# Patient Record
Sex: MALE | URBAN - METROPOLITAN AREA
[De-identification: names, ages, dates, MRNs, and addresses within clinical notes are randomized per-mention and may not be internally consistent; named-entity substitution may affect disease eponyms.]

---

## 2023-10-05 ENCOUNTER — VIRTUAL VISIT (OUTPATIENT)
Dept: PSYCHOLOGY | Facility: CLINIC | Age: 33
End: 2023-10-05
Payer: COMMERCIAL

## 2023-10-05 DIAGNOSIS — F32.A DEPRESSION, UNSPECIFIED DEPRESSION TYPE: Primary | ICD-10-CM

## 2023-10-05 PROCEDURE — 90791 PSYCH DIAGNOSTIC EVALUATION: CPT | Mod: 95 | Performed by: COUNSELOR

## 2023-10-05 ASSESSMENT — COLUMBIA-SUICIDE SEVERITY RATING SCALE - C-SSRS
TOTAL  NUMBER OF INTERRUPTED ATTEMPTS LIFETIME: NO
6. HAVE YOU EVER DONE ANYTHING, STARTED TO DO ANYTHING, OR PREPARED TO DO ANYTHING TO END YOUR LIFE?: NO
1. HAVE YOU WISHED YOU WERE DEAD OR WISHED YOU COULD GO TO SLEEP AND NOT WAKE UP?: YES
1. IN THE PAST MONTH, HAVE YOU WISHED YOU WERE DEAD OR WISHED YOU COULD GO TO SLEEP AND NOT WAKE UP?: YES
ATTEMPT LIFETIME: NO
TOTAL  NUMBER OF ABORTED OR SELF INTERRUPTED ATTEMPTS LIFETIME: NO
2. HAVE YOU ACTUALLY HAD ANY THOUGHTS OF KILLING YOURSELF?: NO
REASONS FOR IDEATION PAST MONTH: MOSTLY TO END OR STOP THE PAIN (YOU COULDN'T GO ON LIVING WITH THE PAIN OR HOW YOU WERE FEELING)

## 2023-10-05 NOTE — PROGRESS NOTES
"    [facility]      PATIENT'S NAME: Som Her  PREFERRED NAME: Som  PRONOUNS: he/him/his     MRN: [id number]  : 1990  ADDRESS: [address] [address]  ACCT. NUMBER:  [id number]  DATE OF SERVICE: 10/05/23  START TIME: 1000  END TIME: 1100  PREFERRED PHONE: [phone]  May we leave a program related message: Yes  SERVICE MODALITY:  Video Visit:      Provider verified identity through the following two step process.  Patient provided:  Patient  and Patient address    Telemedicine Visit: The patient's condition can be safely assessed and treated via synchronous audio and visual telemedicine encounter.      Reason for Telemedicine Visit: Services only offered telehealth    Originating Site (Patient Location): Patient's home    Distant Site (Provider Location): Provider Remote Setting- Home Office    Consent:  The patient/guardian has verbally consented to: the potential risks and benefits of telemedicine (video visit) versus in person care; bill my insurance or make self-payment for services provided; and responsibility for payment of non-covered services.     Patient would like the video invitation sent by:  My Chart    Mode of Communication:  Video Conference via [facility]    Distant Location (Provider):  Off-site    As the provider I attest to compliance with applicable laws and regulations related to telemedicine.    UNIVERSAL ADULT Mental Health DIAGNOSTIC ASSESSMENT    Identifying Information:  Patient is a 33 year old,  Hmong individual.  Patient was referred for an assessment by family.  Patient attended the session alone.    Chief Complaint:   The reason for seeking services at this time is: \"Family issues\".  The problem(s) began 22.    Patient has not attempted to resolve these concerns in the past.    Social/Family History:  Patient reported they grew up in other [place], [place].  They were raised by biological mother; biological father  .  Parents  / . " " Patient reported that their childhood was \"good\".  Patient described their current relationships with family of origin as \"ok with mom, does ok with step dad\".     The patient describes their cultural background as Hmong.  Cultural influences and impact on patient's life structure, values, norms, and healthcare: It is a very role based culture, where men are expected to be the bread winner and women are to support husbands.  Hmong culture centers around family, including extended families helping each other out.  Parents expect eldest son to lead the family and take care of parents and siblings.  Daughters are to be  and taken care of by 's family..  Contextual influences on patient's health include: Contextual Factors: Individual Factors Relationship conflict .    These factors will be addressed in the Preliminary Treatment plan. Patient identified their preferred language to be English. Patient reported they does not need the assistance of an  or other support involved in therapy.     Patient reported had no significant delays in developmental tasks.   Patient's highest education level was college graduate  .  Patient identified the following learning problems: none reported.  Modifications will not be used to assist communication in therapy.  Patient reports they are  able to understand written materials.    Patient reported the following relationship history:   Patient is currently in a relationship.  Patient's current relationship status is has a partner or significant other for 6 years.   Patient identified their sexual orientation as heterosexual.  Patient reported having   0 child(mily). Patient identified partner; parents as part of their support system.  Patient identified the quality of these relationships as fair,  .      Patient's current living/housing situation involves staying in own home/apartment.  The immediate members of family and household include Yulissa Bush, 48,Mom and " step dad and they report that housing is stable.    Patient is currently employed fulltime.  Patient reports their finances are obtained through employment; parents. Patient does identify finances as a current stressor.      Patient reported that they have been involved with the legal system.  I had custody of my siblings and recently transferred to my sister and her  to care for. Patient does not report being under probation/ parole/ jurisdiction. They are not under any current court jurisdiction. .    Patient's Strengths and Limitations:  Patient identified the following strengths or resources that will help them succeed in treatment: commitment to health and well being. Things that may interfere with the patient's success in treatment include: none identified.     Assessments:  The following assessments were completed by patient for this visit:  PHQ2:       10/5/2023     9:39 AM   PHQ-2 ( 1999 [employer])   Q1: Little interest or pleasure in doing things 1   Q2: Feeling down, depressed or hopeless 1   PHQ-2 Score 2   Q1: Little interest or pleasure in doing things Several days   Q2: Feeling down, depressed or hopeless Several days   PHQ-2 Score 2     PHQ9:       9/28/2023     9:43 AM   PHQ-9 SCORE   PHQ-9 Total Score MyChart 6 (Mild depression)   PHQ-9 Total Score 6     GAD7:       9/28/2023    10:02 AM   ALIDA-7 SCORE   Total Score 7 (mild anxiety)   Total Score 7     CAGE-AID:       9/28/2023    10:03 AM   CAGE-AID Total Score   Total Score 0   Total Score MyChart 0 (A total score of 2 or greater is considered clinically significant)     PROMIS 10-Global Health (only subscores and total score):       9/28/2023    10:03 AM   PROMIS-10 Scores Only   Global Mental Health Score 9   Global Physical Health Score 12   PROMIS TOTAL - SUBSCORES 21     Story Suicide Severity Rating Scale (Lifetime/Recent)      10/5/2023    10:42 AM   Story Suicide Severity Rating (Lifetime/Recent)   Q1 Wish to be Dead (Lifetime) Y    1. Wish to be Dead (Past 1 Month) Y   Q2 Non-Specific Active Suicidal Thoughts (Lifetime) N   Most Severe Ideation Rating (Past 1 Month) 2   Frequency (Past 1 Month) 1   Duration (Past 1 Month) 2   Controllability (Past 1 Month) 1   Deterrents (Past 1 Month) 1   Reasons for Ideation (Past 1 Month) 4   Actual Attempt (Lifetime) N   Has subject engaged in non-suicidal self-injurious behavior? (Lifetime) N   Interrupted Attempts (Lifetime) N   Aborted or Self-Interrupted Attempt (Lifetime) N   Preparatory Acts or Behavior (Lifetime) N   Calculated C-SSRS Risk Score (Lifetime/Recent) Low Risk       Personal and Family Medical History:  Patient does not report a family history of mental health concerns.  Patient reports family history is not on file..     Patient does not report Mental Health Diagnosis or Treatment.      Patient has not had a physical exam to rule out medical causes for current symptoms.  Date of last physical exam was greater than a year ago and client was encouraged to schedule an exam with PCP. The patient has a non-[facility] Primary Care Provider. Their PCP is through [facility]..  Patient reports no current medical and/or dental concerns.  Patient denies any issues with pain..   There are not significant appetite / nutritional concerns / weight changes.   Patient does not report a history of head injury / trauma / cognitive impairment.      Patient reports not taking any current medications    Medication Adherence:  Patient reports not currently prescribed.      Patient Allergies:  No Known Allergies    Medical History:  No past medical history on file.      Current Mental Status Exam:   Appearance:  Appropriate    Eye Contact:  Good   Psychomotor:  Normal       Gait / station:  no problem  Attitude / Demeanor: Cooperative  Interested  Speech      Rate / Production: Normal/ Responsive      Volume:  Normal  volume      Language:  intact  Mood:   Normal  Affect:   Appropriate    Thought Content: Clear    Thought Process: Logical       Associations: No loosening of associations  Insight:   Good   Judgment:  Intact   Orientation:  All  Attention/concentration: Good    Substance Use:  Patient did not report a family history of substance use concerns; see medical history section for details.  Patient has not received chemical dependency treatment in the past.  Patient has not ever been to detox.      Patient is not currently receiving any chemical dependency treatment.           Substance History of use Age of first use Date of last use     Pattern and duration of use (include amounts and frequency)   Alcohol currently use   22 09/24/23 REPORTS SUBSTANCE USE: reports using substance 1 times per week and has 6 drinks at a time.   Patient reports heaviest use is current use.   Cannabis   never used     REPORTS SUBSTANCE USE: N/A     Amphetamines   never used     REPORTS SUBSTANCE USE: N/A   Cocaine/crack    never used       REPORTS SUBSTANCE USE: N/A   Hallucinogens never used         REPORTS SUBSTANCE USE: N/A   Inhalants never used         REPORTS SUBSTANCE USE: N/A   Heroin never used         REPORTS SUBSTANCE USE: N/A   Other Opiates never used     REPORTS SUBSTANCE USE: N/A   Benzodiazepine   never used     REPORTS SUBSTANCE USE: N/A   Barbiturates never used     REPORTS SUBSTANCE USE: N/A   Over the counter meds never used     REPORTS SUBSTANCE USE: N/A   Caffeine currently use 15   REPORTS SUBSTANCE USE: reports using substance 1 times per day and has 1 coffee at a time.   Patient reports heaviest use is current use.   Nicotine  never used     REPORTS SUBSTANCE USE: N/A   Other substances not listed above:  Identify:  never used     REPORTS SUBSTANCE USE: N/A     Patient reported the following problems as a result of their substance use: no problems, not applicable.    Substance Use: No symptoms    Based on the negative CAGE score and clinical interview there  are not indications of drug or alcohol  abuse.    Significant Losses / Trauma / Abuse / Neglect Issues:   Patient did  serve in the .  There are indications or report of significant loss, trauma, abuse or neglect issues related to: are no indications and client denies any losses, trauma, abuse, or neglect concerns.  Concerns for possible neglect are not present.     Safety Assessment:   Patient denies current homicidal ideation and behaviors.  Patient denies current self-injurious ideation and behaviors.    Patient denied risk behaviors associated with substance use.  Patient reported impulsive/compulsive spending behaviors associated with mental health symptoms.  Patient reports the following current concerns for their personal safety: None.  Patient reports there are firearms in the house.     yes, they are secured. There are no firearms in the home..    History of Safety Concerns:  Patient denied a history of homicidal ideation.     Patient denied a history of personal safety concerns.    Patient denied a history of assaultive behaviors.    Patient denied a history of sexual assault behaviors.     Patient denied a history of risk behaviors associated with substance use.  Patient denies any history of high risk behaviors associated with mental health symptoms.  Patient reports the following protective factors: forward or future oriented thinking; dedication to family or friends; living with other people; structured day; effective problem solving skills; commitment to well being    Risk Plan:  See Recommendations for Safety and Risk Management Plan    Review of Symptoms per patient report:   Depression: Change in sleep, Lack of interest, Change in energy level, Irritability, Feeling sad, down, or depressed, and Withdrawn  Irma:  No Symptoms  Psychosis: No Symptoms  Anxiety: Excessive worry, Nervousness, Sleep disturbance, and Irritability  Panic:  Sense of impending doom  Post Traumatic Stress Disorder:  No Symptoms   Eating Disorder: No  Symptoms  ADD / ADHD:  No symptoms  Conduct Disorder: No symptoms  Autism Spectrum Disorder: No symptoms  Obsessive Compulsive Disorder: No Symptoms    Patient reports the following compulsive behaviors and treatment history: Gambling - has not had treatment..      Diagnostic Criteria:    - Depressed mood. Note: In children and adolescents, can be irritable mood.     - Fatigue or loss of energy.    - Diminished ability to think or concentrate, or indecisiveness.   B) The symptoms cause clinically significant distress or impairment in social, occupational, or other important areas of functioning  C) The episode is not attributable to the physiological effects of a substance or to another medical condition  D) The occurence of major depressive episode is not better explained by other thought / psychotic disorders  E) There has never been a manic episode or hypomanic episode    Functional Status:  Patient reports the following functional impairments:  management of the household and or completion of tasks and relationship(s).     Nonprogrammatic care:  Patient is requesting basic services to address current mental health concerns.    Clinical Summary:  1. Reason for assessment: to determine level of care  .  2. Psychosocial, Cultural and Contextual Factors: Relationship conflict  .  3. Principal DSM5 Diagnoses  (Sustained by DSM5 Criteria Listed Above):   311 (F32.9) Unspecified Depressive Disorder .  4. Other Diagnoses that is relevant to services:   None at this time.  5. Provisional Diagnosis:  Further diagnosis clarification may be beneficial.  6. Prognosis: Expect Improvement.  7. Likely consequences of symptoms if not treated: higher level of care.  8. Client strengths include:  good listener, insightful, intelligent, motivated, and support of family, friends and providers .     Recommendations:     1. Plan for Safety and Risk Management:   Safety and Risk: A safety and risk management plan has been developed  including: Patient consented to co-developed safety plan.  Safety and risk management plan was completed - see below.  Patient agreed to use safety plan should any safety concerns arise.  A copy was given to the patient..          Report to child / adult protection services was NA.     2. Patient's identified  none identified .     3. Initial Treatment will focus on:    Depressed Mood -    Relational Problems related to: family .     4. Resources/Service Plan:    services are not indicated.   Modifications to assist communication are not indicated.   Additional disability accommodations are not indicated.      5. Collaboration:   Collaboration / coordination of treatment will be initiated with the following  support professionals:  None at this time .      6.  Referrals:   The following referral(s) will be initiated: [facility]. Next Scheduled Appointment: 10/11/23.      A Release of Information has been obtained for the following:  None at this time .     Clinical Substantiation/medical necessity for the above recommendations:    Patient is a 33 year old who presents with mood and family concerns.  Patient denies previous mental health concerns and/or providers.    Patients acute suicide risk was determined to be low due to the following factors: Denial of suicidal thoughts, no history of suicide attempts.   Patient denies current thoughts of suicidal ideation and self harm and reports ability to keep self safe. \Patient completed and reviewed safety plan with  and reports ability to follow plan.    Patient is not currently under the influence of alcohol or illicit substances, denies experiencing command hallucinations, and has no immediate access to firearms. Protective factors include: Patient reports the following protective factors: dedication to family/friends, safe and stable environment, daily obligations, committment to well-being, sense of personal control or  "determination and access to a variety of clinical interventions    Patient denies current substance use concerns and reports ability to maintain safety when using substances.    Patient would benefit from more extensive mental health support such as individual therapy weekly to help mitigate risk of hospitalization or suicidality. Patient is in agreement with plan.      7. RELL:    RELL:  Discussed the general effects of drugs and alcohol on health and well-being. Provider gave patient printed information about the  effects of chemical use on their health and well being. Recommendations:  to abstain from all mood altering substances .     8. Records:   These were reviewed at time of assessment.   Information in this assessment was obtained from the medical record and  provided by patient who is a good historian.    Patient will have open access to their mental health medical record.    9.   Interactive Complexity: No    10. Safety Plan:  When the patient identifies the following:  Wish to die    The following is recommended:   Complete/Review/Update Safety Plan    Safety Plan:  Adult Short Safety Plan:   Name: Som Broussard  YOB: 1990  Date: October 5, 2023   My primary care clinic: [facility]   My Triggers:  Financial concerns       Additional People, Places, and Things that I can access for support: family, friends         What is important to me and makes life worth living: \"friends and fmaily\" .         GREEN    Good Control  1. I feel good  2. No suicidal thoughts   3. Can work, sleep and play      Action Steps  1. Self-care: balanced meals, exercising, sleep practices, etc.  2. Take your medications as prescribed.  3. Continue meetings with therapist and prescriber.  4.  Do the healthy things that I enjoy.             YELLOW  Getting Worse  I have ANY of these:  1. I do not feel good  2. Difficulty Concentrating  3. Sleep is changing  4. Increase/Change in my thoughts to hurt self and/or others, but I can " still manage and not act on it.   5. Not taking care of self.               Action Steps (in addition to the above):  1. Inform your therapist and psychiatric prescriber/PCP.  2. Keep taking your medications as prescribed.    3. Turn to people you can ask for help.  4. Use internal coping strategies -see below.  5. Create safe environment: notify friends/family of increase in symptoms             RED  Get Help  If I have ANY of these:  1. Current and uncontrollable thoughts and/or behaviors to hurt self and/or others.      Actions to manage my safety  1. Contact your emergency person   2. Call or Text [phone]  3. Call my crisis team- [facility] [phone]  3. Or Call 911 or go to the emergency room right away          My Internal Coping Strategies include the following:  use my coping skills      Safety Concerns  How To Identify Situations That Make Your Mental Health Worse:  Triggers are things that make your mental health worse.  Look at the list below to help you find your triggers and what you can do about them.     1. Identify Early Warning Signs:    Sometimes symptoms return, even when people do their best to stay well. Symptoms can develop over a short period of time with little or no warning, but most of the time they emerge gradually over several weeks.  Early warning signs are changes that people experience when a relapse is starting. Some early warning signs are common and others are not as common.   Common Early Warning Signs:    Feeling depressed or low     2. Identify action steps to take when warning signs are noticed:    Taking Action- It is important to take action if you are experiencing early warning signs of a relapse.  The faster you act, the more likely it is that you can avoid a full relapse.  It is helpful to identify several specific ways to cope with symptoms.      The following is my list of symptoms and coping strategies that I can use when they are present:    Symptom Coping Strategies    Anxiety -Talk with someone in your support system and let him or her know how you are feeling.  -Use relaxation techniques such as deep breathing or imagery.  -Use positive affirmations to counteract negative self-talk such as  I am learning to let go of worry.    Depression - Schedule your day; include activities you have to do and activities you enjoy doing.  - Get some exercise - walk, run, bike, or swim.  - Give yourself credit for even the smallest things you get done.   Sleep Difficulties   - Go to sleep at the same time every day.  - Do something relaxing before bed, such as drinking herbal tea or listening to music.  - Avoid having discussions about upsetting topics before going to bed.   Delusions   - Distract yourself from the disturbing thought by doing something that requires your attention such as a puzzle.  - Check out your beliefs by talking to someone you trust.    Hallucinations   - Use headphones to listen to music.  - Tell voices to  stop  or say to yourself,  I am safe.   - Ignore the hallucinations as much as possible; focus on other things.   Concentration Difficulties - Minimize distractions so there is only one thing for you to focus on at a time.    - Ask the person you are having a conversation with to slow down or repeat things you are unsure of.             Provider Name/ Credentials:  Minerva [facility]  October 5, 2023

## 2023-10-12 ENCOUNTER — VIRTUAL VISIT (OUTPATIENT)
Dept: PSYCHOLOGY | Facility: CLINIC | Age: 33
End: 2023-10-12
Payer: COMMERCIAL

## 2023-10-12 DIAGNOSIS — F32.A DEPRESSION, UNSPECIFIED DEPRESSION TYPE: Primary | ICD-10-CM

## 2023-10-12 PROCEDURE — 90837 PSYTX W PT 60 MINUTES: CPT | Mod: VID | Performed by: COUNSELOR

## 2023-10-12 NOTE — PROGRESS NOTES
[facility]                                     Progress Note    Patient Name: Som Broussard  Date: 10/12/23         Service Type: Individual      Session Start Time: 1100  Session End Time: 1153     Session Length: 53 minutes    Session #: 1    Attendees: Client    Service Modality:  Video Visit:      Provider verified identity through the following two step process.  Patient provided:  Patient  and Patient address    Telemedicine Visit: The patient's condition can be safely assessed and treated via synchronous audio and visual telemedicine encounter.      Reason for Telemedicine Visit: Services only offered telehealth    Originating Site (Patient Location): Patient's home    Distant Site (Provider Location): [facility]    Consent:  The patient/guardian has verbally consented to: the potential risks and benefits of telemedicine (video visit) versus in person care; bill my insurance or make self-payment for services provided; and responsibility for payment of non-covered services.     Patient would like the video invitation sent by:  My Chart    Mode of Communication:  Video Conference via Amwell    Distant Location (Provider):  On-site    As the provider I attest to compliance with applicable laws and regulations related to telemedicine.    DATA  Interactive Complexity: No  Crisis: No        Progress Since Last Session (Related to Symptoms / Goals / Homework):   Symptoms: No change      Homework: Completed in session      Episode of Care Goals: Satisfactory progress - PREPARATION (Decided to change - considering how); Intervened by negotiating a change plan and determining options / strategies for behavior change, identifying triggers, exploring social supports, and working towards setting a date to begin behavior change     Current / Ongoing Stressors and Concerns:   Familial conflict     Treatment Objective(s) Addressed in This  Session:   Increase interest, engagement, and pleasure in doing things       Intervention:   Motivational Interviewing: OARS    Assessments completed prior to visit:  The following assessments were completed by patient for this visit:  PHQ9:       9/28/2023     9:43 AM   PHQ-9 SCORE   PHQ-9 Total Score MyChart 6 (Mild depression)   PHQ-9 Total Score 6     GAD7:       9/28/2023    10:02 AM   ALIDA-7 SCORE   Total Score 7 (mild anxiety)   Total Score 7         ASSESSMENT: Current Emotional / Mental Status (status of significant symptoms):   Risk status (Self / Other harm or suicidal ideation)   Patient denies current fears or concerns for personal safety.   Patient denies current or recent suicidal ideation or behaviors.   Patient denies current or recent homicidal ideation or behaviors.   Patient denies current or recent self injurious behavior or ideation.   Patient denies other safety concerns.   Patient reports there has been no change in risk factors since their last session.     Patient reports there has been no change in protective factors since their last session.     A safety and risk management plan has been developed including: Patient consented to co-developed safety plan on 10/5/23.  Safety and risk management plan was reviewed.   Patient agreed to use safety plan should any safety concerns arise.  A copy was made available to the patient.     Appearance:   Appropriate    Eye Contact:   Good    Psychomotor Behavior: Normal    Attitude:   Cooperative  Interested   Orientation:   All   Speech    Rate / Production: Normal     Volume:  Normal    Mood:    Normal   Affect:    Appropriate    Thought Content:  Clear    Thought Form:  Coherent  Logical    Insight:    Good      Medication Review:   No current psychiatric medications prescribed     Medication Compliance:   NA     Changes in Health Issues:   None reported     Chemical Use Review:   Substance Use: Chemical use reviewed, no active concerns identified       Tobacco Use: No current tobacco use.      Diagnosis:  1. Depression, unspecified depression type        Collateral Reports Completed:   Not Applicable    PLAN: (Patient Tasks / Therapist Tasks / Other)  Patient to explore activities that increase mona prior to next session.        Minerva Bobo, [facility]                                                         ______________________________________________________________________    Individual Treatment Plan    Patient's Name: Som Her  YOB: 1990    Date of Creation: 10/12/23  Date Treatment Plan Last Reviewed/Revised: NA    DSM5 Diagnoses: 311 (F32.9) Unspecified Depressive Disorder   Psychosocial / Contextual Factors: Familial conflcit  PROMIS (reviewed every 90 days):     Referral / Collaboration:  Referral to another professional/service is not indicated at this time..    Anticipated number of session for this episode of care: 9-12 sessions  Anticipation frequency of session: Weekly  Anticipated Duration of each session: 38-52 minutes  Treatment plan will be reviewed in 90 days or when goals have been changed.       MeasurableTreatment Goal(s) related to diagnosis / functional impairment(s)  Goal 1: Patient will learn and implement strategies to manage mood.    I will know I've met my goal when I feel better.      Objective #A (Patient Action)    Patient will Increase interest, engagement, and pleasure in doing things.  Status: New - Date: 10/12/23      Intervention(s)  Therapist will teach  emotional regulation skills related to improving mood .    Objective #B  Patient will Decrease frequency and intensity of feeling down, depressed, hopeless.  Status: New - Date: 10/12/23      Intervention(s)  Therapist will teach emotional recognition/identification.   .    Objective #C  Patient will Improve concentration, focus, and mindfulness in daily activities .  Status: New - Date: 10/12/23      Intervention(s)  Therapist will teach skills related to  improving concentration and time management .          Patient has reviewed and agreed to the above plan.      Minerva Bobo, [facility]  October 12, 2023

## 2023-10-19 ENCOUNTER — VIRTUAL VISIT (OUTPATIENT)
Dept: PSYCHOLOGY | Facility: CLINIC | Age: 33
End: 2023-10-19
Payer: COMMERCIAL

## 2023-10-19 DIAGNOSIS — F32.A DEPRESSION, UNSPECIFIED DEPRESSION TYPE: Primary | ICD-10-CM

## 2023-10-19 PROCEDURE — 90834 PSYTX W PT 45 MINUTES: CPT | Mod: VID | Performed by: COUNSELOR

## 2023-10-19 NOTE — PROGRESS NOTES
[facility]                                     Progress Note    Patient Name: Som Broussard  Date: 10/19/23         Service Type: Individual      Session Start Time: 1100  Session End Time: 1145     Session Length: 45 minutes    Session #: 2    Attendees: Client    Service Modality:  Video Visit:      Provider verified identity through the following two step process.  Patient provided:  Patient  and Patient address    Telemedicine Visit: The patient's condition can be safely assessed and treated via synchronous audio and visual telemedicine encounter.      Reason for Telemedicine Visit: Services only offered telehealth    Originating Site (Patient Location): Patient's home    Distant Site (Provider Location[facility]    Consent:  The patient/guardian has verbally consented to: the potential risks and benefits of telemedicine (video visit) versus in person care; bill my insurance or make self-payment for services provided; and responsibility for payment of non-covered services.     Patient would like the video invitation sent by:  My Chart    Mode of Communication:  Video Conference via Amwell    Distant Location (Provider):  On-site    As the provider I attest to compliance with applicable laws and regulations related to telemedicine.    DATA  Interactive Complexity: No  Crisis: No        Progress Since Last Session (Related to Symptoms / Goals / Homework):   Symptoms: No change      Homework: Completed in session      Episode of Care Goals: Satisfactory progress - PREPARATION (Decided to change - considering how); Intervened by negotiating a change plan and determining options / strategies for behavior change, identifying triggers, exploring social supports, and working towards setting a date to begin behavior change     Current / Ongoing Stressors and Concerns:   Familial conflict  Low mood-lack of motivation   Treatment  Objective(s) Addressed in This Session:   Increase interest, engagement, and pleasure in doing things       Intervention:   Motivational Interviewing: OARS    Assessments completed prior to visit:  The following assessments were completed by patient for this visit:  PHQ9:       9/28/2023     9:43 AM   PHQ-9 SCORE   PHQ-9 Total Score MyChart 6 (Mild depression)   PHQ-9 Total Score 6     GAD7:       9/28/2023    10:02 AM   ALIDA-7 SCORE   Total Score 7 (mild anxiety)   Total Score 7         ASSESSMENT: Current Emotional / Mental Status (status of significant symptoms):   Risk status (Self / Other harm or suicidal ideation)   Patient denies current fears or concerns for personal safety.   Patient denies current or recent suicidal ideation or behaviors.   Patient denies current or recent homicidal ideation or behaviors.   Patient denies current or recent self injurious behavior or ideation.   Patient denies other safety concerns.   Patient reports there has been no change in risk factors since their last session.     Patient reports there has been no change in protective factors since their last session.     A safety and risk management plan has been developed including: Patient consented to co-developed safety plan on 10/5/23.  Safety and risk management plan was reviewed.   Patient agreed to use safety plan should any safety concerns arise.  A copy was made available to the patient.     Appearance:   Appropriate    Eye Contact:   Good    Psychomotor Behavior: Normal    Attitude:   Cooperative  Interested   Orientation:   All   Speech    Rate / Production: Normal     Volume:  Normal    Mood:    Normal   Affect:    Appropriate    Thought Content:  Clear    Thought Form:  Coherent  Logical    Insight:    Good      Medication Review:   No current psychiatric medications prescribed     Medication Compliance:   NA     Changes in Health Issues:   None reported     Chemical Use Review:   Substance Use: Chemical use reviewed, no  active concerns identified      Tobacco Use: No current tobacco use.      Diagnosis:  1. Depression, unspecified depression type          Collateral Reports Completed:   Not Applicable    PLAN: (Patient Tasks / Therapist Tasks / Other)  Patient to incorporate breaks through out the day to self regulate, exercise and reading until next session.        Minerva Bobo, [facility]                                                         ______________________________________________________________________    Individual Treatment Plan    Patient's Name: Som Her  YOB: 1990    Date of Creation: 10/12/23  Date Treatment Plan Last Reviewed/Revised: NA    DSM5 Diagnoses: 311 (F32.9) Unspecified Depressive Disorder   Psychosocial / Contextual Factors: Familial conflcit  PROMIS (reviewed every 90 days):     Referral / Collaboration:  Referral to another professional/service is not indicated at this time..    Anticipated number of session for this episode of care: 9-12 sessions  Anticipation frequency of session: Weekly  Anticipated Duration of each session: 38-52 minutes  Treatment plan will be reviewed in 90 days or when goals have been changed.       MeasurableTreatment Goal(s) related to diagnosis / functional impairment(s)  Goal 1: Patient will learn and implement strategies to manage mood.    I will know I've met my goal when I feel better.      Objective #A (Patient Action)    Patient will Increase interest, engagement, and pleasure in doing things.  Status: New - Date: 10/12/23      Intervention(s)  Therapist will teach  emotional regulation skills related to improving mood .    Objective #B  Patient will Decrease frequency and intensity of feeling down, depressed, hopeless.  Status: New - Date: 10/12/23      Intervention(s)  Therapist will teach emotional recognition/identification.   .    Objective #C  Patient will Improve concentration, focus, and mindfulness in daily activities .  Status: New - Date:  10/12/23      Intervention(s)  Therapist will teach skills related to improving concentration and time management .          Patient has reviewed and agreed to the above plan.      Minerva Bobo, [facility]  October 12, 2023

## 2023-10-22 ENCOUNTER — HEALTH MAINTENANCE LETTER (OUTPATIENT)
Age: 33
End: 2023-10-22

## 2023-10-26 ENCOUNTER — VIRTUAL VISIT (OUTPATIENT)
Dept: PSYCHOLOGY | Facility: CLINIC | Age: 33
End: 2023-10-26
Payer: COMMERCIAL

## 2023-10-26 DIAGNOSIS — F32.A DEPRESSION, UNSPECIFIED DEPRESSION TYPE: Primary | ICD-10-CM

## 2023-10-26 PROCEDURE — 90837 PSYTX W PT 60 MINUTES: CPT | Mod: VID | Performed by: COUNSELOR

## 2023-10-26 NOTE — PROGRESS NOTES
[facility]                                     Progress Note    Patient Name: Som Broussard  Date: 10/26/23         Service Type: Individual      Session Start Time: 1100  Session End Time: 1154     Session Length: 54 minutes    Session #: 3    Attendees: Client    Service Modality:  Video Visit:      Provider verified identity through the following two step process.  Patient provided:  Patient  and Patient address    Telemedicine Visit: The patient's condition can be safely assessed and treated via synchronous audio and visual telemedicine encounter.      Reason for Telemedicine Visit: Services only offered telehealth    Originating Site (Patient Location): Patient's home    Distant Site (Provider Location): [facility]    Consent:  The patient/guardian has verbally consented to: the potential risks and benefits of telemedicine (video visit) versus in person care; bill my insurance or make self-payment for services provided; and responsibility for payment of non-covered services.     Patient would like the video invitation sent by:  My Chart    Mode of Communication:  Video Conference via Amwell    Distant Location (Provider):  On-site    As the provider I attest to compliance with applicable laws and regulations related to telemedicine.    DATA  Interactive Complexity: No  Crisis: No        Progress Since Last Session (Related to Symptoms / Goals / Homework):   Symptoms: No change      Homework: Completed in session      Episode of Care Goals: Satisfactory progress - PREPARATION (Decided to change - considering how); Intervened by negotiating a change plan and determining options / strategies for behavior change, identifying triggers, exploring social supports, and working towards setting a date to begin behavior change     Current / Ongoing Stressors and Concerns:   Familial conflict, conflict in relationship and moving out  Low  mood-lack of motivation   Treatment Objective(s) Addressed in This Session:   Increase interest, engagement, and pleasure in doing things       Intervention:   Motivational Interviewing: OARS    Assessments completed prior to visit:  The following assessments were completed by patient for this visit:  PHQ9:       9/28/2023     9:43 AM   PHQ-9 SCORE   PHQ-9 Total Score MyChart 6 (Mild depression)   PHQ-9 Total Score 6     GAD7:       9/28/2023    10:02 AM   ALIDA-7 SCORE   Total Score 7 (mild anxiety)   Total Score 7         ASSESSMENT: Current Emotional / Mental Status (status of significant symptoms):   Risk status (Self / Other harm or suicidal ideation)   Patient denies current fears or concerns for personal safety.   Patient denies current or recent suicidal ideation or behaviors.   Patient denies current or recent homicidal ideation or behaviors.   Patient denies current or recent self injurious behavior or ideation.   Patient denies other safety concerns.   Patient reports there has been no change in risk factors since their last session.     Patient reports there has been no change in protective factors since their last session.     A safety and risk management plan has been developed including: Patient consented to co-developed safety plan on 10/5/23.  Safety and risk management plan was reviewed.   Patient agreed to use safety plan should any safety concerns arise.  A copy was made available to the patient.     Appearance:   Appropriate    Eye Contact:   Good    Psychomotor Behavior: Normal    Attitude:   Cooperative  Interested   Orientation:   All   Speech    Rate / Production: Normal     Volume:  Normal    Mood:    Normal   Affect:    Appropriate    Thought Content:  Clear    Thought Form:  Coherent  Logical    Insight:    Good      Medication Review:   No current psychiatric medications prescribed     Medication Compliance:   NA     Changes in Health Issues:   None reported     Chemical Use  Review:   Substance Use: Chemical use reviewed, no active concerns identified      Tobacco Use: No current tobacco use.      Diagnosis:  1. Depression, unspecified depression type            Collateral Reports Completed:   Not Applicable    PLAN: (Patient Tasks / Therapist Tasks / Other)  Continue to monitor stress and implement breaks for self regulation        Minerva Bobo [facility]                                                         ______________________________________________________________________    Individual Treatment Plan    Patient's Name: Som Her  YOB: 1990    Date of Creation: 10/12/23  Date Treatment Plan Last Reviewed/Revised: NA    DSM5 Diagnoses: 311 (F32.9) Unspecified Depressive Disorder   Psychosocial / Contextual Factors: Familial conflcit  PROMIS (reviewed every 90 days):     Referral / Collaboration:  Referral to another professional/service is not indicated at this time..    Anticipated number of session for this episode of care: 9-12 sessions  Anticipation frequency of session: Weekly  Anticipated Duration of each session: 38-52 minutes  Treatment plan will be reviewed in 90 days or when goals have been changed.       MeasurableTreatment Goal(s) related to diagnosis / functional impairment(s)  Goal 1: Patient will learn and implement strategies to manage mood.    I will know I've met my goal when I feel better.      Objective #A (Patient Action)    Patient will Increase interest, engagement, and pleasure in doing things.  Status: New - Date: 10/12/23      Intervention(s)  Therapist will teach  emotional regulation skills related to improving mood .    Objective #B  Patient will Decrease frequency and intensity of feeling down, depressed, hopeless.  Status: New - Date: 10/12/23      Intervention(s)  Therapist will teach emotional recognition/identification.   .    Objective #C  Patient will Improve concentration, focus, and mindfulness in daily activities .  Status: New -  Date: 10/12/23      Intervention(s)  Therapist will teach skills related to improving concentration and time management .          Patient has reviewed and agreed to the above plan.      Minerva Bobo, [facility]  October 12, 2023

## 2023-11-02 ENCOUNTER — VIRTUAL VISIT (OUTPATIENT)
Dept: PSYCHOLOGY | Facility: CLINIC | Age: 33
End: 2023-11-02
Payer: COMMERCIAL

## 2023-11-02 DIAGNOSIS — F32.A DEPRESSION, UNSPECIFIED DEPRESSION TYPE: Primary | ICD-10-CM

## 2023-11-02 PROCEDURE — 90834 PSYTX W PT 45 MINUTES: CPT | Mod: VID | Performed by: COUNSELOR

## 2023-11-02 ASSESSMENT — PATIENT HEALTH QUESTIONNAIRE - PHQ9
10. IF YOU CHECKED OFF ANY PROBLEMS, HOW DIFFICULT HAVE THESE PROBLEMS MADE IT FOR YOU TO DO YOUR WORK, TAKE CARE OF THINGS AT HOME, OR GET ALONG WITH OTHER PEOPLE: SOMEWHAT DIFFICULT
SUM OF ALL RESPONSES TO PHQ QUESTIONS 1-9: 6
SUM OF ALL RESPONSES TO PHQ QUESTIONS 1-9: 6

## 2023-11-02 NOTE — PROGRESS NOTES
[facility]                                     Progress Note    Patient Name: Som Broussard  Date: 23         Service Type: Individual      Session Start Time: 1100  Session End Time: 1150     Session Length: 50 minutes    Session #: 4    Attendees: Client    Service Modality:  Video Visit:      Provider verified identity through the following two step process.  Patient provided:  Patient  and Patient address    Telemedicine Visit: The patient's condition can be safely assessed and treated via synchronous audio and visual telemedicine encounter.      Reason for Telemedicine Visit: Services only offered telehealth    Originating Site (Patient Location): Patient's home    Distant Site (Provider Location): [facility]    Consent:  The patient/guardian has verbally consented to: the potential risks and benefits of telemedicine (video visit) versus in person care; bill my insurance or make self-payment for services provided; and responsibility for payment of non-covered services.     Patient would like the video invitation sent by:  My Chart    Mode of Communication:  Video Conference via Amwell    Distant Location (Provider):  On-site    As the provider I attest to compliance with applicable laws and regulations related to telemedicine.    DATA  Interactive Complexity: No  Crisis: No        Progress Since Last Session (Related to Symptoms / Goals / Homework):   Symptoms:Improving    Homework: Completed in session      Episode of Care Goals: Satisfactory progress - PREPARATION (Decided to change - considering how); Intervened by negotiating a change plan and determining options / strategies for behavior change, identifying triggers, exploring social supports, and working towards setting a date to begin behavior change     Current / Ongoing Stressors and Concerns:   Familial conflict, conflict in relationship and moving out  Low mood-lack  of motivation   Treatment Objective(s) Addressed in This Session:   Increase interest, engagement, and pleasure in doing things       Intervention:   Motivational Interviewing: OARS    Assessments completed prior to visit:  The following assessments were completed by patient for this visit:  PHQ9:       9/28/2023     9:43 AM 11/2/2023    10:53 AM   PHQ-9 SCORE   PHQ-9 Total Score MyChart 6 (Mild depression) 6 (Mild depression)   PHQ-9 Total Score 6 6     GAD7:       9/28/2023    10:02 AM   ALIDA-7 SCORE   Total Score 7 (mild anxiety)   Total Score 7         ASSESSMENT: Current Emotional / Mental Status (status of significant symptoms):   Risk status (Self / Other harm or suicidal ideation)   Patient denies current fears or concerns for personal safety.   Patient denies current or recent suicidal ideation or behaviors.   Patient denies current or recent homicidal ideation or behaviors.   Patient denies current or recent self injurious behavior or ideation.   Patient denies other safety concerns.   Patient reports there has been no change in risk factors since their last session.     Patient reports there has been no change in protective factors since their last session.     A safety and risk management plan has been developed including: Patient consented to co-developed safety plan on 10/5/23.  Safety and risk management plan was reviewed.   Patient agreed to use safety plan should any safety concerns arise.  A copy was made available to the patient.     Appearance:   Appropriate    Eye Contact:   Good    Psychomotor Behavior: Normal    Attitude:   Cooperative  Interested   Orientation:   All   Speech    Rate / Production: Normal     Volume:  Normal    Mood:    Normal   Affect:    Appropriate    Thought Content:  Clear    Thought Form:  Coherent  Logical    Insight:    Good      Medication Review:   No current psychiatric medications prescribed     Medication Compliance:   NA     Changes in Health Issues:   None  reported     Chemical Use Review:   Substance Use: Chemical use reviewed, no active concerns identified      Tobacco Use: No current tobacco use.      Diagnosis:  1. Depression, unspecified depression type              Collateral Reports Completed:   Not Applicable    PLAN: (Patient Tasks / Therapist Tasks / Other)  Continue to monitor stress and implement breaks for self regulation, explore relationship values and how to discuss them with parents.        Minerva Bobo, [facility]                                                         ______________________________________________________________________    Individual Treatment Plan    Patient's Name: Som Her  YOB: 1990    Date of Creation: 10/12/23  Date Treatment Plan Last Reviewed/Revised: NA    DSM5 Diagnoses: 311 (F32.9) Unspecified Depressive Disorder   Psychosocial / Contextual Factors: Familial conflcit  PROMIS (reviewed every 90 days):     Referral / Collaboration:  Referral to another professional/service is not indicated at this time..    Anticipated number of session for this episode of care: 9-12 sessions  Anticipation frequency of session: Weekly  Anticipated Duration of each session: 38-52 minutes  Treatment plan will be reviewed in 90 days or when goals have been changed.       MeasurableTreatment Goal(s) related to diagnosis / functional impairment(s)  Goal 1: Patient will learn and implement strategies to manage mood.    I will know I've met my goal when I feel better.      Objective #A (Patient Action)    Patient will Increase interest, engagement, and pleasure in doing things.  Status: New - Date: 10/12/23      Intervention(s)  Therapist will teach  emotional regulation skills related to improving mood .    Objective #B  Patient will Decrease frequency and intensity of feeling down, depressed, hopeless.  Status: New - Date: 10/12/23      Intervention(s)  Therapist will teach emotional recognition/identification.   .    Objective  #C  Patient will Improve concentration, focus, and mindfulness in daily activities .  Status: New - Date: 10/12/23      Intervention(s)  Therapist will teach skills related to improving concentration and time management .          Patient has reviewed and agreed to the above plan.      Minerva Bobo, [facility]  October 12, 2023

## 2023-11-09 ENCOUNTER — VIRTUAL VISIT (OUTPATIENT)
Dept: PSYCHOLOGY | Facility: CLINIC | Age: 33
End: 2023-11-09
Payer: COMMERCIAL

## 2023-11-09 DIAGNOSIS — F32.A DEPRESSION, UNSPECIFIED DEPRESSION TYPE: Primary | ICD-10-CM

## 2023-11-09 PROCEDURE — 90837 PSYTX W PT 60 MINUTES: CPT | Mod: VID | Performed by: COUNSELOR

## 2023-11-09 ASSESSMENT — PATIENT HEALTH QUESTIONNAIRE - PHQ9
10. IF YOU CHECKED OFF ANY PROBLEMS, HOW DIFFICULT HAVE THESE PROBLEMS MADE IT FOR YOU TO DO YOUR WORK, TAKE CARE OF THINGS AT HOME, OR GET ALONG WITH OTHER PEOPLE: SOMEWHAT DIFFICULT
SUM OF ALL RESPONSES TO PHQ QUESTIONS 1-9: 7
SUM OF ALL RESPONSES TO PHQ QUESTIONS 1-9: 7

## 2023-11-09 NOTE — PROGRESS NOTES
[facility]                                     Progress Note    Patient Name: Som Broussard  Date: 23         Service Type: Individual      Session Start Time: 1100  Session End Time: 1155     Session Length: 55 minutes    Session #: 5    Attendees: Client    Service Modality:  Video Visit:      Provider verified identity through the following two step process.  Patient provided:  Patient  and Patient address    Telemedicine Visit: The patient's condition can be safely assessed and treated via synchronous audio and visual telemedicine encounter.      Reason for Telemedicine Visit: Services only offered telehealth    Originating Site (Patient Location): Patient's home    Distant Site (Provider Location[facility]    Consent:  The patient/guardian has verbally consented to: the potential risks and benefits of telemedicine (video visit) versus in person care; bill my insurance or make self-payment for services provided; and responsibility for payment of non-covered services.     Patient would like the video invitation sent by:  My Chart    Mode of Communication:  Video Conference via Amwell    Distant Location (Provider):  On-site    As the provider I attest to compliance with applicable laws and regulations related to telemedicine.    DATA  Interactive Complexity: No  Crisis: No        Progress Since Last Session (Related to Symptoms / Goals / Homework):   Symptoms:Improving    Homework: Completed in session      Episode of Care Goals: Satisfactory progress - PREPARATION (Decided to change - considering how); Intervened by negotiating a change plan and determining options / strategies for behavior change, identifying triggers, exploring social supports, and working towards setting a date to begin behavior change     Current / Ongoing Stressors and Concerns:   Familial conflict, Death of Uncle  Low mood-lack of motivation   Treatment  Objective(s) Addressed in This Session:   Increase interest, engagement, and pleasure in doing things       Intervention:   Motivational Interviewing: OARS    Assessments completed prior to visit:  The following assessments were completed by patient for this visit:  PHQ9:       9/28/2023     9:43 AM 11/2/2023    10:53 AM   PHQ-9 SCORE   PHQ-9 Total Score MyChart 6 (Mild depression) 6 (Mild depression)   PHQ-9 Total Score 6 6     GAD7:       9/28/2023    10:02 AM   ALIDA-7 SCORE   Total Score 7 (mild anxiety)   Total Score 7         ASSESSMENT: Current Emotional / Mental Status (status of significant symptoms):   Risk status (Self / Other harm or suicidal ideation)   Patient denies current fears or concerns for personal safety.   Patient denies current or recent suicidal ideation or behaviors.   Patient denies current or recent homicidal ideation or behaviors.   Patient denies current or recent self injurious behavior or ideation.   Patient denies other safety concerns.   Patient reports there has been no change in risk factors since their last session.     Patient reports there has been no change in protective factors since their last session.     A safety and risk management plan has been developed including: Patient consented to co-developed safety plan on 10/5/23.  Safety and risk management plan was reviewed.   Patient agreed to use safety plan should any safety concerns arise.  A copy was made available to the patient.     Appearance:   Appropriate    Eye Contact:   Good    Psychomotor Behavior: Normal    Attitude:   Cooperative  Interested   Orientation:   All   Speech    Rate / Production: Normal     Volume:  Normal    Mood:    Normal   Affect:    Appropriate    Thought Content:  Clear    Thought Form:  Coherent  Logical    Insight:    Good      Medication Review:   No current psychiatric medications prescribed     Medication Compliance:   NA     Changes in Health Issues:   None reported     Chemical Use  Review:   Substance Use: Chemical use reviewed, no active concerns identified      Tobacco Use: No current tobacco use.      Diagnosis:  1. Depression, unspecified depression type                Collateral Reports Completed:   Not Applicable    PLAN: (Patient Tasks / Therapist Tasks / Other)  Patient to continue with self regulation techniques and explore future goals.        Minerva Bobo, [facility]                                                         ______________________________________________________________________    Individual Treatment Plan    Patient's Name: Som Her  YOB: 1990    Date of Creation: 10/12/23  Date Treatment Plan Last Reviewed/Revised: NA    DSM5 Diagnoses: 311 (F32.9) Unspecified Depressive Disorder   Psychosocial / Contextual Factors: Familial conflcit  PROMIS (reviewed every 90 days):     Referral / Collaboration:  Referral to another professional/service is not indicated at this time..    Anticipated number of session for this episode of care: 9-12 sessions  Anticipation frequency of session: Weekly  Anticipated Duration of each session: 38-52 minutes  Treatment plan will be reviewed in 90 days or when goals have been changed.       MeasurableTreatment Goal(s) related to diagnosis / functional impairment(s)  Goal 1: Patient will learn and implement strategies to manage mood.    I will know I've met my goal when I feel better.      Objective #A (Patient Action)    Patient will Increase interest, engagement, and pleasure in doing things.  Status: New - Date: 10/12/23      Intervention(s)  Therapist will teach  emotional regulation skills related to improving mood .    Objective #B  Patient will Decrease frequency and intensity of feeling down, depressed, hopeless.  Status: New - Date: 10/12/23      Intervention(s)  Therapist will teach emotional recognition/identification.   .    Objective #C  Patient will Improve concentration, focus, and mindfulness in daily activities  .  Status: New - Date: 10/12/23      Intervention(s)  Therapist will teach skills related to improving concentration and time management .          Patient has reviewed and agreed to the above plan.      Minerva Bobo, [facility]  October 12, 2023

## 2023-11-16 ENCOUNTER — VIRTUAL VISIT (OUTPATIENT)
Dept: PSYCHOLOGY | Facility: CLINIC | Age: 33
End: 2023-11-16
Payer: COMMERCIAL

## 2023-11-16 DIAGNOSIS — F32.A DEPRESSION, UNSPECIFIED DEPRESSION TYPE: Primary | ICD-10-CM

## 2023-11-16 PROCEDURE — 90837 PSYTX W PT 60 MINUTES: CPT | Mod: VID | Performed by: COUNSELOR

## 2023-11-16 NOTE — PROGRESS NOTES
[facility]                                     Progress Note    Patient Name: Som Broussard  Date: 23         Service Type: Individual      Session Start Time: 1100  Session End Time: 1155     Session Length: 55 minutes    Session #: 6    Attendees: Client    Service Modality:  Video Visit:      Provider verified identity through the following two step process.  Patient provided:  Patient  and Patient address    Telemedicine Visit: The patient's condition can be safely assessed and treated via synchronous audio and visual telemedicine encounter.      Reason for Telemedicine Visit: Services only offered telehealth    Originating Site (Patient Location): Patient's home    Distant Site (Provider [facility]    Consent:  The patient/guardian has verbally consented to: the potential risks and benefits of telemedicine (video visit) versus in person care; bill my insurance or make self-payment for services provided; and responsibility for payment of non-covered services.     Patient would like the video invitation sent by:  My Chart    Mode of Communication:  Video Conference via Amwell    Distant Location (Provider):  On-site    As the provider I attest to compliance with applicable laws and regulations related to telemedicine.    DATA  Interactive Complexity: No  Crisis: No        Progress Since Last Session (Related to Symptoms / Goals / Homework):   Symptoms:Improving    Homework: Completed in session      Episode of Care Goals: Satisfactory progress - PREPARATION (Decided to change - considering how); Intervened by negotiating a change plan and determining options / strategies for behavior change, identifying triggers, exploring social supports, and working towards setting a date to begin behavior change     Current / Ongoing Stressors and Concerns:   Familial contact and moving out    Low mood-lack of  motivation   Treatment Objective(s) Addressed in This Session:   Increase interest, engagement, and pleasure in doing things       Intervention:   Motivational Interviewing: OARS    Assessments completed prior to visit:  The following assessments were completed by patient for this visit:  PHQ9:       9/28/2023     9:43 AM 11/2/2023    10:53 AM 11/9/2023    10:50 AM   PHQ-9 SCORE   PHQ-9 Total Score MyChart 6 (Mild depression) 6 (Mild depression) 7 (Mild depression)   PHQ-9 Total Score 6 6 7     GAD7:       9/28/2023    10:02 AM   ALIDA-7 SCORE   Total Score 7 (mild anxiety)   Total Score 7         ASSESSMENT: Current Emotional / Mental Status (status of significant symptoms):   Risk status (Self / Other harm or suicidal ideation)   Patient denies current fears or concerns for personal safety.   Patient denies current or recent suicidal ideation or behaviors.   Patient denies current or recent homicidal ideation or behaviors.   Patient denies current or recent self injurious behavior or ideation.   Patient denies other safety concerns.   Patient reports there has been no change in risk factors since their last session.     Patient reports there has been no change in protective factors since their last session.     A safety and risk management plan has been developed including: Patient consented to co-developed safety plan on 10/5/23.  Safety and risk management plan was reviewed.   Patient agreed to use safety plan should any safety concerns arise.  A copy was made available to the patient.     Appearance:   Appropriate    Eye Contact:   Good    Psychomotor Behavior: Normal    Attitude:   Cooperative  Interested   Orientation:   All   Speech    Rate / Production: Normal     Volume:  Normal    Mood:    Normal   Affect:    Appropriate    Thought Content:  Clear    Thought Form:  Coherent  Logical    Insight:    Good      Medication Review:   No current psychiatric medications prescribed     Medication  Compliance:   NA     Changes in Health Issues:   None reported     Chemical Use Review:   Substance Use: Chemical use reviewed, no active concerns identified      Tobacco Use: No current tobacco use.      Diagnosis:  1. Depression, unspecified depression type                  Collateral Reports Completed:   Not Applicable    PLAN: (Patient Tasks / Therapist Tasks / Other)  Patient to have conversations with family regarding next steps and moving out.        Minerva Bobo, [facility]                                                         ______________________________________________________________________    Individual Treatment Plan    Patient's Name: Som Her  YOB: 1990    Date of Creation: 10/12/23  Date Treatment Plan Last Reviewed/Revised: NA    DSM5 Diagnoses: 311 (F32.9) Unspecified Depressive Disorder   Psychosocial / Contextual Factors: Familial conflcit  PROMIS (reviewed every 90 days):     Referral / Collaboration:  Referral to another professional/service is not indicated at this time..    Anticipated number of session for this episode of care: 9-12 sessions  Anticipation frequency of session: Weekly  Anticipated Duration of each session: 38-52 minutes  Treatment plan will be reviewed in 90 days or when goals have been changed.       MeasurableTreatment Goal(s) related to diagnosis / functional impairment(s)  Goal 1: Patient will learn and implement strategies to manage mood.    I will know I've met my goal when I feel better.      Objective #A (Patient Action)    Patient will Increase interest, engagement, and pleasure in doing things.  Status: New - Date: 10/12/23      Intervention(s)  Therapist will teach  emotional regulation skills related to improving mood .    Objective #B  Patient will Decrease frequency and intensity of feeling down, depressed, hopeless.  Status: New - Date: 10/12/23      Intervention(s)  Therapist will teach emotional recognition/identification.   .    Objective  #C  Patient will Improve concentration, focus, and mindfulness in daily activities .  Status: New - Date: 10/12/23      Intervention(s)  Therapist will teach skills related to improving concentration and time management .          Patient has reviewed and agreed to the above plan.      Minerva Bobo, [facility]  October 12, 2023

## 2023-11-30 ENCOUNTER — VIRTUAL VISIT (OUTPATIENT)
Dept: PSYCHOLOGY | Facility: CLINIC | Age: 33
End: 2023-11-30
Payer: COMMERCIAL

## 2023-11-30 DIAGNOSIS — F32.A DEPRESSION, UNSPECIFIED DEPRESSION TYPE: Primary | ICD-10-CM

## 2023-11-30 PROCEDURE — 90834 PSYTX W PT 45 MINUTES: CPT | Mod: VID | Performed by: COUNSELOR

## 2023-11-30 ASSESSMENT — PATIENT HEALTH QUESTIONNAIRE - PHQ9
SUM OF ALL RESPONSES TO PHQ QUESTIONS 1-9: 7
10. IF YOU CHECKED OFF ANY PROBLEMS, HOW DIFFICULT HAVE THESE PROBLEMS MADE IT FOR YOU TO DO YOUR WORK, TAKE CARE OF THINGS AT HOME, OR GET ALONG WITH OTHER PEOPLE: SOMEWHAT DIFFICULT
SUM OF ALL RESPONSES TO PHQ QUESTIONS 1-9: 7

## 2023-11-30 NOTE — PROGRESS NOTES
[facility]                                     Progress Note    Patient Name: Som Broussard  Date: 23         Service Type: Individual      Session Start Time: 1100  Session End Time: 1152     Session Length: 52 minutes    Session #: 7    Attendees: Client    Service Modality:  Video Visit:      Provider verified identity through the following two step process.  Patient provided:  Patient  and Patient address    Telemedicine Visit: The patient's condition can be safely assessed and treated via synchronous audio and visual telemedicine encounter.      Reason for Telemedicine Visit: Services only offered telehealth    Originating Site (Patient Location): Patient's home    Distant Site (Provider [facility]    Consent:  The patient/guardian has verbally consented to: the potential risks and benefits of telemedicine (video visit) versus in person care; bill my insurance or make self-payment for services provided; and responsibility for payment of non-covered services.     Patient would like the video invitation sent by:  My Chart    Mode of Communication:  Video Conference via Amwell    Distant Location (Provider):  On-site    As the provider I attest to compliance with applicable laws and regulations related to telemedicine.    DATA  Interactive Complexity: No  Crisis: No        Progress Since Last Session (Related to Symptoms / Goals / Homework):   Symptoms:Improving    Homework: Completed in session      Episode of Care Goals: Satisfactory progress - PREPARATION (Decided to change - considering how); Intervened by negotiating a change plan and determining options / strategies for behavior change, identifying triggers, exploring social supports, and working towards setting a date to begin behavior change     Current / Ongoing Stressors and Concerns:   Familial contact and moving out    Low mood-lack of  motivation   Treatment Objective(s) Addressed in This Session:   Increase interest, engagement, and pleasure in doing things       Intervention:   Motivational Interviewing: OARS    Assessments completed prior to visit:  The following assessments were completed by patient for this visit:  PHQ9:       9/28/2023     9:43 AM 11/2/2023    10:53 AM 11/9/2023    10:50 AM 11/30/2023    10:56 AM   PHQ-9 SCORE   PHQ-9 Total Score MyChart 6 (Mild depression) 6 (Mild depression) 7 (Mild depression) 7 (Mild depression)   PHQ-9 Total Score 6 6 7 7     GAD7:       9/28/2023    10:02 AM   ALIDA-7 SCORE   Total Score 7 (mild anxiety)   Total Score 7         ASSESSMENT: Current Emotional / Mental Status (status of significant symptoms):   Risk status (Self / Other harm or suicidal ideation)   Patient denies current fears or concerns for personal safety.   Patient denies current or recent suicidal ideation or behaviors.   Patient denies current or recent homicidal ideation or behaviors.   Patient denies current or recent self injurious behavior or ideation.   Patient denies other safety concerns.   Patient reports there has been no change in risk factors since their last session.     Patient reports there has been no change in protective factors since their last session.     A safety and risk management plan has been developed including: Patient consented to co-developed safety plan on 10/5/23.  Safety and risk management plan was reviewed.   Patient agreed to use safety plan should any safety concerns arise.  A copy was made available to the patient.     Appearance:   Appropriate    Eye Contact:   Good    Psychomotor Behavior: Normal    Attitude:   Cooperative  Interested   Orientation:   All   Speech    Rate / Production: Normal     Volume:  Normal    Mood:    Normal   Affect:    Appropriate    Thought Content:  Clear    Thought Form:  Coherent  Logical    Insight:    Good      Medication Review:   No current psychiatric medications  prescribed     Medication Compliance:   NA     Changes in Health Issues:   None reported     Chemical Use Review:   Substance Use: Chemical use reviewed, no active concerns identified      Tobacco Use: No current tobacco use.      Diagnosis:  1. Depression, unspecified depression type                    Collateral Reports Completed:   Not Applicable    PLAN: (Patient Tasks / Therapist Tasks / Other)  Patient to work on building trust with family and focusing on longer term benefits of financial stability.        Minerva Bobo, [facility]                                                         ______________________________________________________________________    Individual Treatment Plan    Patient's Name: Som Her  YOB: 1990    Date of Creation: 10/12/23  Date Treatment Plan Last Reviewed/Revised: NA    DSM5 Diagnoses: 311 (F32.9) Unspecified Depressive Disorder   Psychosocial / Contextual Factors: Familial conflcit  PROMIS (reviewed every 90 days):     Referral / Collaboration:  Referral to another professional/service is not indicated at this time..    Anticipated number of session for this episode of care: 9-12 sessions  Anticipation frequency of session: Weekly  Anticipated Duration of each session: 38-52 minutes  Treatment plan will be reviewed in 90 days or when goals have been changed.       MeasurableTreatment Goal(s) related to diagnosis / functional impairment(s)  Goal 1: Patient will learn and implement strategies to manage mood.    I will know I've met my goal when I feel better.      Objective #A (Patient Action)    Patient will Increase interest, engagement, and pleasure in doing things.  Status: New - Date: 10/12/23      Intervention(s)  Therapist will teach  emotional regulation skills related to improving mood .    Objective #B  Patient will Decrease frequency and intensity of feeling down, depressed, hopeless.  Status: New - Date: 10/12/23      Intervention(s)  Therapist will teach  emotional recognition/identification.   .    Objective #C  Patient will Improve concentration, focus, and mindfulness in daily activities .  Status: New - Date: 10/12/23      Intervention(s)  Therapist will teach skills related to improving concentration and time management .          Patient has reviewed and agreed to the above plan.      Minerva Bobo, [facility]  October 12, 2023

## 2023-12-07 ENCOUNTER — VIRTUAL VISIT (OUTPATIENT)
Dept: PSYCHOLOGY | Facility: CLINIC | Age: 33
End: 2023-12-07
Payer: COMMERCIAL

## 2023-12-07 DIAGNOSIS — F32.A DEPRESSION, UNSPECIFIED DEPRESSION TYPE: Primary | ICD-10-CM

## 2023-12-07 PROCEDURE — 90834 PSYTX W PT 45 MINUTES: CPT | Mod: VID | Performed by: COUNSELOR

## 2023-12-07 ASSESSMENT — PATIENT HEALTH QUESTIONNAIRE - PHQ9
SUM OF ALL RESPONSES TO PHQ QUESTIONS 1-9: 7
SUM OF ALL RESPONSES TO PHQ QUESTIONS 1-9: 7
10. IF YOU CHECKED OFF ANY PROBLEMS, HOW DIFFICULT HAVE THESE PROBLEMS MADE IT FOR YOU TO DO YOUR WORK, TAKE CARE OF THINGS AT HOME, OR GET ALONG WITH OTHER PEOPLE: NOT DIFFICULT AT ALL

## 2023-12-07 NOTE — PROGRESS NOTES
[facility]                                     Progress Note    Patient Name: Som Broussard  Date: 23         Service Type: Individual      Session Start Time: 1100  Session End Time: 1145     Session Length: 45 minutes    Session #: 8    Attendees: Client    Service Modality:  Video Visit:      Provider verified identity through the following two step process.  Patient provided:  Patient  and Patient address    Telemedicine Visit: The patient's condition can be safely assessed and treated via synchronous audio and visual telemedicine encounter.      Reason for Telemedicine Visit: Services only offered telehealth    Originating Site (Patient Location): Patient's home    Distant Site (Provider Location): [facility] [place] [facility]    Consent:  The patient/guardian has verbally consented to: the potential risks and benefits of telemedicine (video visit) versus in person care; bill my insurance or make self-payment for services provided; and responsibility for payment of non-covered services.     Patient would like the video invitation sent by:  My Chart    Mode of Communication:  Video Conference via Amwell    Distant Location (Provider):  On-site    As the provider I attest to compliance with applicable laws and regulations related to telemedicine.    DATA  Interactive Complexity: No  Crisis: No        Progress Since Last Session (Related to Symptoms / Goals / Homework):   Symptoms:Improving    Homework: Completed in session      Episode of Care Goals: Satisfactory progress - PREPARATION (Decided to change - considering how); Intervened by negotiating a change plan and determining options / strategies for behavior change, identifying triggers, exploring social supports, and working towards setting a date to begin behavior change     Current / Ongoing Stressors and Concerns:   Familial contact and moving out    Low mood-lack of motivation   Treatment  Objective(s) Addressed in This Session:   Increase interest, engagement, and pleasure in doing things       Intervention:   Motivational Interviewing: OARS    Assessments completed prior to visit:  The following assessments were completed by patient for this visit:  PHQ9:       9/28/2023     9:43 AM 11/2/2023    10:53 AM 11/9/2023    10:50 AM 11/30/2023    10:56 AM 12/7/2023    10:58 AM   PHQ-9 SCORE   PHQ-9 Total Score MyChart 6 (Mild depression) 6 (Mild depression) 7 (Mild depression) 7 (Mild depression) 7 (Mild depression)   PHQ-9 Total Score 6 6 7 7 7     GAD7:       9/28/2023    10:02 AM   ALIDA-7 SCORE   Total Score 7 (mild anxiety)   Total Score 7         ASSESSMENT: Current Emotional / Mental Status (status of significant symptoms):   Risk status (Self / Other harm or suicidal ideation)   Patient denies current fears or concerns for personal safety.   Patient denies current or recent suicidal ideation or behaviors.   Patient denies current or recent homicidal ideation or behaviors.   Patient denies current or recent self injurious behavior or ideation.   Patient denies other safety concerns.   Patient reports there has been no change in risk factors since their last session.     Patient reports there has been no change in protective factors since their last session.     A safety and risk management plan has been developed including: Patient consented to co-developed safety plan on 10/5/23.  Safety and risk management plan was reviewed.   Patient agreed to use safety plan should any safety concerns arise.  A copy was made available to the patient.     Appearance:   Appropriate    Eye Contact:   Good    Psychomotor Behavior: Normal    Attitude:   Cooperative  Interested   Orientation:   All   Speech    Rate / Production: Normal     Volume:  Normal    Mood:    Normal   Affect:    Appropriate    Thought Content:  Clear    Thought Form:  Coherent  Logical    Insight:    Good      Medication Review:   No current  psychiatric medications prescribed     Medication Compliance:   NA     Changes in Health Issues:   None reported     Chemical Use Review:   Substance Use: Chemical use reviewed, no active concerns identified      Tobacco Use: No current tobacco use.      Diagnosis:  1. Depression, unspecified depression type                      Collateral Reports Completed:   Not Applicable    PLAN: (Patient Tasks / Therapist Tasks / Other)  Patient to continue to practice self regulation and identify expectations of self.      Minerva Bobo, [facility]                                                         ______________________________________________________________________    Individual Treatment Plan    Patient's Name: Som Her  YOB: 1990    Date of Creation: 10/12/23  Date Treatment Plan Last Reviewed/Revised: NA    DSM5 Diagnoses: 311 (F32.9) Unspecified Depressive Disorder   Psychosocial / Contextual Factors: Familial conflcit  PROMIS (reviewed every 90 days):     Referral / Collaboration:  Referral to another professional/service is not indicated at this time..    Anticipated number of session for this episode of care: 9-12 sessions  Anticipation frequency of session: Weekly  Anticipated Duration of each session: 38-52 minutes  Treatment plan will be reviewed in 90 days or when goals have been changed.       MeasurableTreatment Goal(s) related to diagnosis / functional impairment(s)  Goal 1: Patient will learn and implement strategies to manage mood.    I will know I've met my goal when I feel better.      Objective #A (Patient Action)    Patient will Increase interest, engagement, and pleasure in doing things.  Status: New - Date: 10/12/23      Intervention(s)  Therapist will teach  emotional regulation skills related to improving mood .    Objective #B  Patient will Decrease frequency and intensity of feeling down, depressed, hopeless.  Status: New - Date: 10/12/23      Intervention(s)  Therapist will teach  emotional recognition/identification.   .    Objective #C  Patient will Improve concentration, focus, and mindfulness in daily activities .  Status: New - Date: 10/12/23      Intervention(s)  Therapist will teach skills related to improving concentration and time management .          Patient has reviewed and agreed to the above plan.      Minerva Bobo, [facility]  October 12, 2023

## 2023-12-21 ENCOUNTER — VIRTUAL VISIT (OUTPATIENT)
Dept: PSYCHOLOGY | Facility: CLINIC | Age: 33
End: 2023-12-21
Payer: COMMERCIAL

## 2023-12-21 DIAGNOSIS — F32.A DEPRESSION, UNSPECIFIED DEPRESSION TYPE: Primary | ICD-10-CM

## 2023-12-21 PROCEDURE — 90834 PSYTX W PT 45 MINUTES: CPT | Mod: VID | Performed by: COUNSELOR

## 2023-12-21 ASSESSMENT — PATIENT HEALTH QUESTIONNAIRE - PHQ9
SUM OF ALL RESPONSES TO PHQ QUESTIONS 1-9: 7
10. IF YOU CHECKED OFF ANY PROBLEMS, HOW DIFFICULT HAVE THESE PROBLEMS MADE IT FOR YOU TO DO YOUR WORK, TAKE CARE OF THINGS AT HOME, OR GET ALONG WITH OTHER PEOPLE: NOT DIFFICULT AT ALL
SUM OF ALL RESPONSES TO PHQ QUESTIONS 1-9: 7

## 2023-12-21 NOTE — PROGRESS NOTES
[facility]                                     Progress Note    Patient Name: Som Broussard  Date: 23         Service Type: Individual      Session Start Time: 1100  Session End Time: 1145     Session Length: 45 minutes    Session #: 9    Attendees: Client    Service Modality:  Video Visit:      Provider verified identity through the following two step process.  Patient provided:  Patient  and Patient address    Telemedicine Visit: The patient's condition can be safely assessed and treated via synchronous audio and visual telemedicine encounter.      Reason for Telemedicine Visit: Services only offered telehealth    Originating Site (Patient Location): Patient's home    Distant Site (Provider [facility]    Consent:  The patient/guardian has verbally consented to: the potential risks and benefits of telemedicine (video visit) versus in person care; bill my insurance or make self-payment for services provided; and responsibility for payment of non-covered services.     Patient would like the video invitation sent by:  My Chart    Mode of Communication:  Video Conference via Amwell    Distant Location (Provider):  On-site    As the provider I attest to compliance with applicable laws and regulations related to telemedicine.    DATA  Interactive Complexity: No  Crisis: No        Progress Since Last Session (Related to Symptoms / Goals / Homework):   Symptoms:Improving    Homework: Completed in session      Episode of Care Goals: Satisfactory progress - PREPARATION (Decided to change - considering how); Intervened by negotiating a change plan and determining options / strategies for behavior change, identifying triggers, exploring social supports, and working towards setting a date to begin behavior change     Current / Ongoing Stressors and Concerns:   Familial contact and moving out    Low mood-lack of  motivation   Treatment Objective(s) Addressed in This Session:   Increase interest, engagement, and pleasure in doing things       Intervention:   Motivational Interviewing: OARS    Assessments completed prior to visit:  The following assessments were completed by patient for this visit:  PHQ9:       9/28/2023     9:43 AM 11/2/2023    10:53 AM 11/9/2023    10:50 AM 11/30/2023    10:56 AM 12/7/2023    10:58 AM 12/21/2023    11:01 AM   PHQ-9 SCORE   PHQ-9 Total Score MyChart 6 (Mild depression) 6 (Mild depression) 7 (Mild depression) 7 (Mild depression) 7 (Mild depression) 7 (Mild depression)   PHQ-9 Total Score 6 6 7 7 7 7     GAD7:       9/28/2023    10:02 AM   ALIDA-7 SCORE   Total Score 7 (mild anxiety)   Total Score 7         ASSESSMENT: Current Emotional / Mental Status (status of significant symptoms):   Risk status (Self / Other harm or suicidal ideation)   Patient denies current fears or concerns for personal safety.   Patient denies current or recent suicidal ideation or behaviors.   Patient denies current or recent homicidal ideation or behaviors.   Patient denies current or recent self injurious behavior or ideation.   Patient denies other safety concerns.   Patient reports there has been no change in risk factors since their last session.     Patient reports there has been no change in protective factors since their last session.     A safety and risk management plan has been developed including: Patient consented to co-developed safety plan on 10/5/23.  Safety and risk management plan was reviewed.   Patient agreed to use safety plan should any safety concerns arise.  A copy was made available to the patient.     Appearance:   Appropriate    Eye Contact:   Good    Psychomotor Behavior: Normal    Attitude:   Cooperative  Interested   Orientation:   All   Speech    Rate / Production: Normal     Volume:  Normal    Mood:    Normal   Affect:    Appropriate    Thought Content:  Clear    Thought Form:  Coherent   Logical    Insight:    Good      Medication Review:   No current psychiatric medications prescribed     Medication Compliance:   NA     Changes in Health Issues:   None reported     Chemical Use Review:   Substance Use: Chemical use reviewed, no active concerns identified      Tobacco Use: No current tobacco use.      Diagnosis:  1. Depression, unspecified depression type                        Collateral Reports Completed:   Not Applicable    PLAN: (Patient Tasks / Therapist Tasks / Other)  Patient to use time to reflect on new life goals and practice self care until next session.      Minerva Garciaserendira, [facility]                                                         ______________________________________________________________________    Individual Treatment Plan    Patient's Name: Som Her  YOB: 1990    Date of Creation: 10/12/23  Date Treatment Plan Last Reviewed/Revised: NA    DSM5 Diagnoses: 311 (F32.9) Unspecified Depressive Disorder   Psychosocial / Contextual Factors: Familial conflcit  PROMIS (reviewed every 90 days):     Referral / Collaboration:  Referral to another professional/service is not indicated at this time..    Anticipated number of session for this episode of care: 9-12 sessions  Anticipation frequency of session: Weekly  Anticipated Duration of each session: 38-52 minutes  Treatment plan will be reviewed in 90 days or when goals have been changed.       MeasurableTreatment Goal(s) related to diagnosis / functional impairment(s)  Goal 1: Patient will learn and implement strategies to manage mood.    I will know I've met my goal when I feel better.      Objective #A (Patient Action)    Patient will Increase interest, engagement, and pleasure in doing things.  Status: New - Date: 10/12/23      Intervention(s)  Therapist will teach  emotional regulation skills related to improving mood .    Objective #B  Patient will Decrease frequency and intensity of feeling down, depressed,  hopeless.  Status: New - Date: 10/12/23      Intervention(s)  Therapist will teach emotional recognition/identification.   .    Objective #C  Patient will Improve concentration, focus, and mindfulness in daily activities .  Status: New - Date: 10/12/23      Intervention(s)  Therapist will teach skills related to improving concentration and time management .          Patient has reviewed and agreed to the above plan.      Minerva Bobo, [facility]  October 12, 2023

## 2024-01-04 ENCOUNTER — VIRTUAL VISIT (OUTPATIENT)
Dept: PSYCHOLOGY | Facility: CLINIC | Age: 34
End: 2024-01-04
Payer: COMMERCIAL

## 2024-01-04 DIAGNOSIS — F32.A DEPRESSION, UNSPECIFIED DEPRESSION TYPE: Primary | ICD-10-CM

## 2024-01-04 PROCEDURE — 90834 PSYTX W PT 45 MINUTES: CPT | Mod: 95 | Performed by: COUNSELOR

## 2024-01-04 ASSESSMENT — PATIENT HEALTH QUESTIONNAIRE - PHQ9
SUM OF ALL RESPONSES TO PHQ QUESTIONS 1-9: 6
10. IF YOU CHECKED OFF ANY PROBLEMS, HOW DIFFICULT HAVE THESE PROBLEMS MADE IT FOR YOU TO DO YOUR WORK, TAKE CARE OF THINGS AT HOME, OR GET ALONG WITH OTHER PEOPLE: NOT DIFFICULT AT ALL
SUM OF ALL RESPONSES TO PHQ QUESTIONS 1-9: 6

## 2024-01-04 NOTE — PROGRESS NOTES
[facility]                                     Progress Note    Patient Name: Smo Broussard  Date: 24         Service Type: Individual      Session Start Time: 1100  Session End Time: 1145     Session Length: 45 minutes    Session #: 10    Attendees: Client    Service Modality:  Video Visit:      Provider verified identity through the following two step process.  Patient provided:  Patient  and Patient address    Telemedicine Visit: The patient's condition can be safely assessed and treated via synchronous audio and visual telemedicine encounter.      Reason for Telemedicine Visit: Services only offered telehealth    Originating Site (Patient Location): Patient's home    Distant Site (Provider Location): [facility]    Consent:  The patient/guardian has verbally consented to: the potential risks and benefits of telemedicine (video visit) versus in person care; bill my insurance or make self-payment for services provided; and responsibility for payment of non-covered services.     Patient would like the video invitation sent by:  My Chart    Mode of Communication:  Video Conference via Amwell    Distant Location (Provider):  On-site    As the provider I attest to compliance with applicable laws and regulations related to telemedicine.    DATA  Interactive Complexity: No  Crisis: No        Progress Since Last Session (Related to Symptoms / Goals / Homework):   Symptoms:Improving    Homework: Completed in session      Episode of Care Goals: Satisfactory progress - PREPARATION (Decided to change - considering how); Intervened by negotiating a change plan and determining options / strategies for behavior change, identifying triggers, exploring social supports, and working towards setting a date to begin behavior change     Current / Ongoing Stressors and Concerns:   Familial contact and moving out    Low mood-lack of motivation   Treatment  Objective(s) Addressed in This Session:   Increase interest, engagement, and pleasure in doing things       Intervention:   Motivational Interviewing: OARS    Assessments completed prior to visit:  The following assessments were completed by patient for this visit:  PHQ9:       9/28/2023     9:43 AM 11/2/2023    10:53 AM 11/9/2023    10:50 AM 11/30/2023    10:56 AM 12/7/2023    10:58 AM 12/21/2023    11:01 AM 1/4/2024    10:54 AM   PHQ-9 SCORE   PHQ-9 Total Score MyChart 6 (Mild depression) 6 (Mild depression) 7 (Mild depression) 7 (Mild depression) 7 (Mild depression) 7 (Mild depression) 6 (Mild depression)   PHQ-9 Total Score 6 6 7 7 7 7 6     GAD7:       9/28/2023    10:02 AM   ALIDA-7 SCORE   Total Score 7 (mild anxiety)   Total Score 7         ASSESSMENT: Current Emotional / Mental Status (status of significant symptoms):   Risk status (Self / Other harm or suicidal ideation)   Patient denies current fears or concerns for personal safety.   Patient denies current or recent suicidal ideation or behaviors.   Patient denies current or recent homicidal ideation or behaviors.   Patient denies current or recent self injurious behavior or ideation.   Patient denies other safety concerns.   Patient reports there has been no change in risk factors since their last session.     Patient reports there has been no change in protective factors since their last session.     A safety and risk management plan has been developed including: Patient consented to co-developed safety plan on 10/5/23.  Safety and risk management plan was reviewed.   Patient agreed to use safety plan should any safety concerns arise.  A copy was made available to the patient.     Appearance:   Appropriate    Eye Contact:   Good    Psychomotor Behavior: Normal    Attitude:   Cooperative  Interested   Orientation:   All   Speech    Rate / Production: Normal     Volume:  Normal    Mood:    Normal   Affect:    Appropriate    Thought Content:  Clear     Thought Form:  Coherent  Logical    Insight:    Good      Medication Review:   No current psychiatric medications prescribed     Medication Compliance:   NA     Changes in Health Issues:   None reported     Chemical Use Review:   Substance Use: Chemical use reviewed, no active concerns identified      Tobacco Use: No current tobacco use.      Diagnosis:  1. Depression, unspecified depression type                          Collateral Reports Completed:   Not Applicable    PLAN: (Patient Tasks / Therapist Tasks / Other)  Patient to practice daily self care to regulate mood until next session.    Minerva Bobo, [facility]                                                         ______________________________________________________________________    Individual Treatment Plan    Patient's Name: Som Her  YOB: 1990    Date of Creation: 10/12/23  Date Treatment Plan Last Reviewed/Revised: NA    DSM5 Diagnoses: 311 (F32.9) Unspecified Depressive Disorder   Psychosocial / Contextual Factors: Familial conflcit  PROMIS (reviewed every 90 days):     Referral / Collaboration:  Referral to another professional/service is not indicated at this time..    Anticipated number of session for this episode of care: 9-12 sessions  Anticipation frequency of session: Weekly  Anticipated Duration of each session: 38-52 minutes  Treatment plan will be reviewed in 90 days or when goals have been changed.       MeasurableTreatment Goal(s) related to diagnosis / functional impairment(s)  Goal 1: Patient will learn and implement strategies to manage mood.    I will know I've met my goal when I feel better.      Objective #A (Patient Action)    Patient will Increase interest, engagement, and pleasure in doing things.  Status: New - Date: 10/12/23      Intervention(s)  Therapist will teach  emotional regulation skills related to improving mood .    Objective #B  Patient will Decrease frequency and intensity of feeling down, depressed,  hopeless.  Status: New - Date: 10/12/23      Intervention(s)  Therapist will teach emotional recognition/identification.   .    Objective #C  Patient will Improve concentration, focus, and mindfulness in daily activities .  Status: New - Date: 10/12/23      Intervention(s)  Therapist will teach skills related to improving concentration and time management .          Patient has reviewed and agreed to the above plan.      Minerva Bobo, [facility]  October 12, 2023

## 2024-01-11 ENCOUNTER — VIRTUAL VISIT (OUTPATIENT)
Dept: PSYCHOLOGY | Facility: CLINIC | Age: 34
End: 2024-01-11
Payer: COMMERCIAL

## 2024-01-11 DIAGNOSIS — F32.A DEPRESSION, UNSPECIFIED DEPRESSION TYPE: Primary | ICD-10-CM

## 2024-01-11 PROCEDURE — 90834 PSYTX W PT 45 MINUTES: CPT | Mod: 95 | Performed by: COUNSELOR

## 2024-01-11 NOTE — PROGRESS NOTES
[facility]                                     Progress Note    Patient Name: Som Broussard  Date: 24         Service Type: Individual      Session Start Time: 1100  Session End Time: 1145     Session Length: 45 minutes    Session #: 12    Attendees: Client    Service Modality:  Video Visit:      Provider verified identity through the following two step process.  Patient provided:  Patient  and Patient address    Telemedicine Visit: The patient's condition can be safely assessed and treated via synchronous audio and visual telemedicine encounter.      Reason for Telemedicine Visit: Services only offered telehealth    Originating Site (Patient Location): Patient's home    Distant Site (Provider Location): [facility]    Consent:  The patient/guardian has verbally consented to: the potential risks and benefits of telemedicine (video visit) versus in person care; bill my insurance or make self-payment for services provided; and responsibility for payment of non-covered services.     Patient would like the video invitation sent by:  My Chart    Mode of Communication:  Video Conference via Amwell    Distant Location (Provider):  On-site    As the provider I attest to compliance with applicable laws and regulations related to telemedicine.    DATA  Interactive Complexity: No  Crisis: No        Progress Since Last Session (Related to Symptoms / Goals / Homework):   Symptoms:Improving    Homework: Completed in session      Episode of Care Goals: Satisfactory progress - PREPARATION (Decided to change - considering how); Intervened by negotiating a change plan and determining options / strategies for behavior change, identifying triggers, exploring social supports, and working towards setting a date to begin behavior change     Current / Ongoing Stressors and Concerns:   Familial contact and moving out    Low mood-lack of  motivation   Treatment Objective(s) Addressed in This Session:   Increase interest, engagement, and pleasure in doing things       Intervention:   Motivational Interviewing: OARS    Assessments completed prior to visit:  The following assessments were completed by patient for this visit:  PHQ9:       11/2/2023    10:53 AM 11/9/2023    10:50 AM 11/30/2023    10:56 AM 12/7/2023    10:58 AM 12/21/2023    11:01 AM 1/4/2024    10:54 AM 1/11/2024    11:01 AM   PHQ-9 SCORE   PHQ-9 Total Score MyChart 6 (Mild depression) 7 (Mild depression) 7 (Mild depression) 7 (Mild depression) 7 (Mild depression) 6 (Mild depression) 6 (Mild depression)   PHQ-9 Total Score 6 7 7 7 7 6 6     GAD7:       9/28/2023    10:02 AM   ALIDA-7 SCORE   Total Score 7 (mild anxiety)   Total Score 7         ASSESSMENT: Current Emotional / Mental Status (status of significant symptoms):   Risk status (Self / Other harm or suicidal ideation)   Patient denies current fears or concerns for personal safety.   Patient denies current or recent suicidal ideation or behaviors.   Patient denies current or recent homicidal ideation or behaviors.   Patient denies current or recent self injurious behavior or ideation.   Patient denies other safety concerns.   Patient reports there has been no change in risk factors since their last session.     Patient reports there has been no change in protective factors since their last session.     A safety and risk management plan has been developed including: Patient consented to co-developed safety plan on 10/5/23.  Safety and risk management plan was reviewed.   Patient agreed to use safety plan should any safety concerns arise.  A copy was made available to the patient.     Appearance:   Appropriate    Eye Contact:   Good    Psychomotor Behavior: Normal    Attitude:   Cooperative  Interested   Orientation:   All   Speech    Rate / Production: Normal     Volume:  Normal    Mood:    Normal   Affect:    Appropriate    Thought  Content:  Clear    Thought Form:  Coherent  Logical    Insight:    Good      Medication Review:   No current psychiatric medications prescribed     Medication Compliance:   NA     Changes in Health Issues:   None reported     Chemical Use Review:   Substance Use: Chemical use reviewed, no active concerns identified      Tobacco Use: No current tobacco use.      Diagnosis:  1. Depression, unspecified depression type                            Collateral Reports Completed:   Not Applicable    PLAN: (Patient Tasks / Therapist Tasks / Other)  Patient to practice self care daily and helpful thoughts when thinking about previous choices.    Minerva Bobo, [facility]                                                         ______________________________________________________________________    Individual Treatment Plan    Patient's Name: Som Her  YOB: 1990    Date of Creation: 10/12/23  Date Treatment Plan Last Reviewed/Revised: NA    DSM5 Diagnoses: 311 (F32.9) Unspecified Depressive Disorder   Psychosocial / Contextual Factors: Familial conflcit  PROMIS (reviewed every 90 days):     Referral / Collaboration:  Referral to another professional/service is not indicated at this time..    Anticipated number of session for this episode of care: 9-12 sessions  Anticipation frequency of session: Weekly  Anticipated Duration of each session: 38-52 minutes  Treatment plan will be reviewed in 90 days or when goals have been changed.       MeasurableTreatment Goal(s) related to diagnosis / functional impairment(s)  Goal 1: Patient will learn and implement strategies to manage mood.    I will know I've met my goal when I feel better.      Objective #A (Patient Action)    Patient will Increase interest, engagement, and pleasure in doing things.  Status: New - Date: 10/12/23      Intervention(s)  Therapist will teach  emotional regulation skills related to improving mood .    Objective #B  Patient will Decrease frequency and  intensity of feeling down, depressed, hopeless.  Status: New - Date: 10/12/23      Intervention(s)  Therapist will teach emotional recognition/identification.   .    Objective #C  Patient will Improve concentration, focus, and mindfulness in daily activities .  Status: New - Date: 10/12/23      Intervention(s)  Therapist will teach skills related to improving concentration and time management .          Patient has reviewed and agreed to the above plan.      Minerva Bobo, [facility]  October 12, 2023

## 2024-01-18 ENCOUNTER — VIRTUAL VISIT (OUTPATIENT)
Dept: PSYCHOLOGY | Facility: CLINIC | Age: 34
End: 2024-01-18
Payer: COMMERCIAL

## 2024-01-18 DIAGNOSIS — F32.A DEPRESSION, UNSPECIFIED DEPRESSION TYPE: Primary | ICD-10-CM

## 2024-01-18 PROCEDURE — 90834 PSYTX W PT 45 MINUTES: CPT | Mod: 95 | Performed by: COUNSELOR

## 2024-01-18 ASSESSMENT — PATIENT HEALTH QUESTIONNAIRE - PHQ9
10. IF YOU CHECKED OFF ANY PROBLEMS, HOW DIFFICULT HAVE THESE PROBLEMS MADE IT FOR YOU TO DO YOUR WORK, TAKE CARE OF THINGS AT HOME, OR GET ALONG WITH OTHER PEOPLE: NOT DIFFICULT AT ALL
SUM OF ALL RESPONSES TO PHQ QUESTIONS 1-9: 7
SUM OF ALL RESPONSES TO PHQ QUESTIONS 1-9: 7

## 2024-01-18 NOTE — PROGRESS NOTES
[facility]                                     Progress Note    Patient Name: Som Broussard  Date: 24         Service Type: Individual      Session Start Time: 1100  Session End Time: 1145     Session Length: 45 minutes    Session #: 13    Attendees: Client    Service Modality:  Video Visit:      Provider verified identity through the following two step process.  Patient provided:  Patient  and Patient address    Telemedicine Visit: The patient's condition can be safely assessed and treated via synchronous audio and visual telemedicine encounter.      Reason for Telemedicine Visit: Services only offered telehealth    Originating Site (Patient Location): Patient's home    Distant Site (Provider [facility]    Consent:  The patient/guardian has verbally consented to: the potential risks and benefits of telemedicine (video visit) versus in person care; bill my insurance or make self-payment for services provided; and responsibility for payment of non-covered services.     Patient would like the video invitation sent by:  My Chart    Mode of Communication:  Video Conference via Amwell    Distant Location (Provider):  On-site    As the provider I attest to compliance with applicable laws and regulations related to telemedicine.    DATA  Interactive Complexity: No  Crisis: No        Progress Since Last Session (Related to Symptoms / Goals / Homework):   Symptoms:Improving    Homework: Completed in session      Episode of Care Goals: Satisfactory progress - PREPARATION (Decided to change - considering how); Intervened by negotiating a change plan and determining options / strategies for behavior change, identifying triggers, exploring social supports, and working towards setting a date to begin behavior change     Current / Ongoing Stressors and Concerns:   Familial contact and moving out    Low mood-lack of  motivation   Treatment Objective(s) Addressed in This Session:   Increase interest, engagement, and pleasure in doing things       Intervention:   Motivational Interviewing: OARS    Assessments completed prior to visit:  The following assessments were completed by patient for this visit:  PHQ9:       11/9/2023    10:50 AM 11/30/2023    10:56 AM 12/7/2023    10:58 AM 12/21/2023    11:01 AM 1/4/2024    10:54 AM 1/11/2024    11:01 AM 1/18/2024    10:59 AM   PHQ-9 SCORE   PHQ-9 Total Score MyChart 7 (Mild depression) 7 (Mild depression) 7 (Mild depression) 7 (Mild depression) 6 (Mild depression) 6 (Mild depression) 7 (Mild depression)   PHQ-9 Total Score 7 7 7 7 6 6 7     GAD7:       9/28/2023    10:02 AM   ALIDA-7 SCORE   Total Score 7 (mild anxiety)   Total Score 7         ASSESSMENT: Current Emotional / Mental Status (status of significant symptoms):   Risk status (Self / Other harm or suicidal ideation)   Patient denies current fears or concerns for personal safety.   Patient denies current or recent suicidal ideation or behaviors.   Patient denies current or recent homicidal ideation or behaviors.   Patient denies current or recent self injurious behavior or ideation.   Patient denies other safety concerns.   Patient reports there has been no change in risk factors since their last session.     Patient reports there has been no change in protective factors since their last session.     A safety and risk management plan has been developed including: Patient consented to co-developed safety plan on 10/5/23.  Safety and risk management plan was reviewed.   Patient agreed to use safety plan should any safety concerns arise.  A copy was made available to the patient.     Appearance:   Appropriate    Eye Contact:   Good    Psychomotor Behavior: Normal    Attitude:   Cooperative  Interested   Orientation:   All   Speech    Rate / Production: Normal     Volume:  Normal    Mood:    Normal   Affect:    Appropriate    Thought  Content:  Clear    Thought Form:  Coherent  Logical    Insight:    Good      Medication Review:   No current psychiatric medications prescribed     Medication Compliance:   NA     Changes in Health Issues:   None reported     Chemical Use Review:   Substance Use: Chemical use reviewed, no active concerns identified      Tobacco Use: No current tobacco use.      Diagnosis:  1. Depression, unspecified depression type                              Collateral Reports Completed:   Not Applicable    PLAN: (Patient Tasks / Therapist Tasks / Other)  Patient to continue to practice intentional reflections through out the week until next session.    Minervajennifer Bobo, [facility]                                                         ______________________________________________________________________    Individual Treatment Plan    Patient's Name: Som Her  YOB: 1990    Date of Creation: 10/12/23  Date Treatment Plan Last Reviewed/Revised: NA    DSM5 Diagnoses: 311 (F32.9) Unspecified Depressive Disorder   Psychosocial / Contextual Factors: Familial conflcit  PROMIS (reviewed every 90 days): Completed 10/5/23    Referral / Collaboration:  Referral to another professional/service is not indicated at this time..    Anticipated number of session for this episode of care: 9-12 sessions  Anticipation frequency of session: Weekly  Anticipated Duration of each session: 38-52 minutes  Treatment plan will be reviewed in 90 days or when goals have been changed.       MeasurableTreatment Goal(s) related to diagnosis / functional impairment(s)  Goal 1: Patient will learn and implement strategies to manage mood.    I will know I've met my goal when I feel better.      Objective #A (Patient Action)    Patient will Increase interest, engagement, and pleasure in doing things.  Status: New - Date: 10/12/23      Intervention(s)  Therapist will teach  emotional regulation skills related to improving mood .    Objective #B  Patient will  Decrease frequency and intensity of feeling down, depressed, hopeless.  Status: New - Date: 10/12/23      Intervention(s)  Therapist will teach emotional recognition/identification.   .    Objective #C  Patient will Improve concentration, focus, and mindfulness in daily activities .  Status: New - Date: 10/12/23      Intervention(s)  Therapist will teach skills related to improving concentration and time management .          Patient has reviewed and agreed to the above plan.      Minerva Bobo, [facility]  October 12, 2023

## 2024-09-06 ENCOUNTER — FCC EXTENDED DOCUMENTATION (OUTPATIENT)
Dept: PSYCHOLOGY | Facility: CLINIC | Age: 34
End: 2024-09-06
Payer: COMMERCIAL

## 2024-09-06 DIAGNOSIS — F32.A DEPRESSION, UNSPECIFIED DEPRESSION TYPE: Primary | ICD-10-CM

## 2024-09-06 NOTE — PROGRESS NOTES
Discharge Summary  Multiple Sessions    Client Name: Som Broussard MRN#: [id number] YOB: 1990      Intake / Discharge Date: 10/5/23 discharge 9/6/24 patient last seen 1/18/24      DSM5 Diagnoses: (Sustained by DSM5 Criteria Listed Above)  Diagnoses: 311 (F32.8) Other/unspec. Depressive Disorder  Psychosocial & Contextual Factors: Gambling issues  WHODAS 2.0 (12 item) Score: NA          Presenting Concern:  Patient presented with mood concerns related to gambling issues and familial conflict.      Reason for Discharge:  Client is satisfied with progress and Client did not return      Disposition at Time of Last Encounter:   Comments:   Patient did well during therapy - actively used skills dicussed in session and reported improvement in mood and relationships.  Patient denied safety concerns through out treatment.     Risk Management:   Client has had a history of suicidal ideation: patient endorsed thoughts of not wanting to be alive and denies a history of suicide attempts, self-injurious behavior, homicidal ideation, homicidal behavior, and and other safety concerns  A safety and risk management plan has been developed including: Patient consented to co-developed safety plan on 10/5/23.  Safety and risk management plan was reviewed.   Patient agreed to use safety plan should any safety concerns arise.  A copy was made available to the patient.      Referred To:  Primary care        OBDULIO Kathleen   9/6/2024

## 2024-12-15 ENCOUNTER — HEALTH MAINTENANCE LETTER (OUTPATIENT)
Age: 34
End: 2024-12-15